# Patient Record
Sex: MALE | Race: WHITE | NOT HISPANIC OR LATINO | URBAN - METROPOLITAN AREA
[De-identification: names, ages, dates, MRNs, and addresses within clinical notes are randomized per-mention and may not be internally consistent; named-entity substitution may affect disease eponyms.]

---

## 2022-02-15 ENCOUNTER — HISTORICAL (OUTPATIENT)
Dept: ADMINISTRATIVE | Facility: HOSPITAL | Age: 57
End: 2022-02-15

## 2022-02-15 LAB
ABS NEUT (OLG): 10.32 (ref 2.1–9.2)
ALBUMIN SERPL-MCNC: 4.1 G/DL (ref 3.5–5)
ALBUMIN/GLOB SERPL: 1.3 {RATIO} (ref 1.1–2)
ALP SERPL-CCNC: 160 U/L (ref 40–150)
ALT SERPL-CCNC: 42 U/L (ref 0–55)
AST SERPL-CCNC: 28 U/L (ref 5–34)
BASOPHILS # BLD AUTO: 0 10*3/UL (ref 0–0.2)
BASOPHILS NFR BLD AUTO: 0 %
BILIRUB SERPL-MCNC: 0.5 MG/DL
BILIRUBIN DIRECT+TOT PNL SERPL-MCNC: 0.2 (ref 0–0.5)
BILIRUBIN DIRECT+TOT PNL SERPL-MCNC: 0.3 (ref 0–0.8)
BUN SERPL-MCNC: 24.3 MG/DL (ref 8.4–25.7)
CALCIUM SERPL-MCNC: 10 MG/DL (ref 8.7–10.5)
CHLORIDE SERPL-SCNC: 104 MMOL/L (ref 98–107)
CHOLEST SERPL-MCNC: 280 MG/DL
CHOLEST/HDLC SERPL: 5 {RATIO} (ref 0–5)
CO2 SERPL-SCNC: 28 MMOL/L (ref 22–29)
CREAT SERPL-MCNC: 0.79 MG/DL (ref 0.73–1.18)
DEPRECATED CALCIDIOL+CALCIFEROL SERPL-MC: 45.2 NG/ML (ref 30–80)
EOSINOPHIL # BLD AUTO: 0 10*3/UL (ref 0–0.9)
EOSINOPHIL NFR BLD AUTO: 0 %
ERYTHROCYTE [DISTWIDTH] IN BLOOD BY AUTOMATED COUNT: 13.5 % (ref 11.5–17)
EST. AVERAGE GLUCOSE BLD GHB EST-MCNC: 108.3 MG/DL
FERRITIN SERPL-MCNC: 118.17 NG/ML (ref 21.81–274.66)
GLOBULIN SER-MCNC: 3.1 G/DL (ref 2.4–3.5)
GLUCOSE SERPL-MCNC: 87 MG/DL (ref 74–100)
HBA1C MFR BLD: 5.4 %
HCT VFR BLD AUTO: 41.4 % (ref 42–52)
HDLC SERPL-MCNC: 56 MG/DL (ref 35–60)
HEMOLYSIS INTERF INDEX SERPL-ACNC: 8
HGB BLD-MCNC: 13.4 G/DL (ref 14–18)
ICTERIC INTERF INDEX SERPL-ACNC: 1
IRON SATN MFR SERPL: 25 % (ref 20–50)
IRON SERPL-MCNC: 87 UG/DL (ref 65–175)
LDLC SERPL CALC-MCNC: 201 MG/DL (ref 50–140)
LIPEMIC INTERF INDEX SERPL-ACNC: 9
LYMPHOCYTES # BLD AUTO: 1.2 10*3/UL (ref 0.6–4.6)
LYMPHOCYTES NFR BLD AUTO: 10 %
MANUAL DIFF? (OHS): NO
MCH RBC QN AUTO: 28.9 PG (ref 27–31)
MCHC RBC AUTO-ENTMCNC: 32.4 G/DL (ref 33–36)
MCV RBC AUTO: 89.2 FL (ref 80–94)
MONOCYTES # BLD AUTO: 0.8 10*3/UL (ref 0.1–1.3)
MONOCYTES NFR BLD AUTO: 6 %
NEUTROPHILS # BLD AUTO: 10.32 10*3/UL (ref 2.1–9.2)
NEUTROPHILS NFR BLD AUTO: 83 %
PLATELET # BLD AUTO: 255 10*3/UL (ref 130–400)
PMV BLD AUTO: 11.6 FL (ref 9.4–12.4)
POTASSIUM SERPL-SCNC: 4.4 MMOL/L (ref 3.5–5.1)
PROT SERPL-MCNC: 7.2 G/DL (ref 6.4–8.3)
PSA SERPL-MCNC: 1.07 NG/ML
RBC # BLD AUTO: 4.64 10*6/UL (ref 4.7–6.1)
SODIUM SERPL-SCNC: 142 MMOL/L (ref 136–145)
TIBC SERPL-MCNC: 262 UG/DL (ref 69–240)
TIBC SERPL-MCNC: 349 UG/DL (ref 250–450)
TRANSFERRIN SERPL-MCNC: 319 MG/DL (ref 174–364)
TRIGL SERPL-MCNC: 116 MG/DL (ref 34–140)
URATE SERPL-MCNC: 6.3 MG/DL (ref 3.5–7.2)
VLDLC SERPL CALC-MCNC: 23 MG/DL
WBC # SPEC AUTO: 12.4 10*3/UL (ref 4.5–11.5)

## 2022-04-10 ENCOUNTER — HISTORICAL (OUTPATIENT)
Dept: ADMINISTRATIVE | Facility: HOSPITAL | Age: 57
End: 2022-04-10
Payer: COMMERCIAL

## 2022-04-26 VITALS
HEIGHT: 67 IN | SYSTOLIC BLOOD PRESSURE: 101 MMHG | BODY MASS INDEX: 30.02 KG/M2 | OXYGEN SATURATION: 100 % | DIASTOLIC BLOOD PRESSURE: 67 MMHG | WEIGHT: 191.25 LBS

## 2022-05-16 ENCOUNTER — TELEPHONE (OUTPATIENT)
Dept: PRIMARY CARE CLINIC | Facility: CLINIC | Age: 57
End: 2022-05-16

## 2022-05-16 RX ORDER — HYDROCHLOROTHIAZIDE 12.5 MG/1
12.5 CAPSULE ORAL
COMMUNITY
Start: 2021-08-27 | End: 2022-05-23 | Stop reason: SDUPTHER

## 2022-05-16 NOTE — TELEPHONE ENCOUNTER
----- Message from Fernanda Spain sent at 5/13/2022 10:58 AM CDT -----  Type: RX Refill Request    Who Called: Santosh    Refill or New Rx:Refill    RX Name and Strength:Colchicine    How is the patient currently taking it? (ex. 1XDay):PRN    Is this a 30 day or 90 day RX:30 day    Preferred Pharmacy with phone number:[facility]  [address]    Local or Mail Order:local    Ordering Provider:Isaiah    Would the patient rather a call back or a response via [facility]? call    Best Call Back Number:[phone]    Additional Information:

## 2022-05-18 ENCOUNTER — TELEPHONE (OUTPATIENT)
Dept: PRIMARY CARE CLINIC | Facility: CLINIC | Age: 57
End: 2022-05-18
Payer: COMMERCIAL

## 2022-05-18 DIAGNOSIS — R53.81 MALAISE AND FATIGUE: ICD-10-CM

## 2022-05-18 DIAGNOSIS — R53.83 MALAISE AND FATIGUE: ICD-10-CM

## 2022-05-18 DIAGNOSIS — Z00.00 WELLNESS EXAMINATION: ICD-10-CM

## 2022-05-18 DIAGNOSIS — Z12.5 PROSTATE CANCER SCREENING: ICD-10-CM

## 2022-05-18 DIAGNOSIS — E34.9 TESTOSTERONE DEFICIENCY: ICD-10-CM

## 2022-05-18 DIAGNOSIS — E78.5 HYPERLIPIDEMIA, UNSPECIFIED HYPERLIPIDEMIA TYPE: Primary | ICD-10-CM

## 2022-05-27 ENCOUNTER — OFFICE VISIT (OUTPATIENT)
Dept: PRIMARY CARE CLINIC | Facility: CLINIC | Age: 57
End: 2022-05-27
Payer: COMMERCIAL

## 2022-05-27 VITALS
HEART RATE: 60 BPM | DIASTOLIC BLOOD PRESSURE: 69 MMHG | WEIGHT: 204.38 LBS | OXYGEN SATURATION: 95 % | BODY MASS INDEX: 32.08 KG/M2 | SYSTOLIC BLOOD PRESSURE: 115 MMHG

## 2022-05-27 DIAGNOSIS — I10 HYPERTENSION, UNSPECIFIED TYPE: ICD-10-CM

## 2022-05-27 DIAGNOSIS — E78.01 FAMILIAL HYPERCHOLESTEROLEMIA: Primary | ICD-10-CM

## 2022-05-27 PROCEDURE — 1160F RVW MEDS BY RX/DR IN RCRD: CPT | Mod: CPTII,,, | Performed by: FAMILY MEDICINE

## 2022-05-27 PROCEDURE — 99214 OFFICE O/P EST MOD 30 MIN: CPT | Mod: ,,, | Performed by: FAMILY MEDICINE

## 2022-05-27 PROCEDURE — 3074F SYST BP LT 130 MM HG: CPT | Mod: CPTII,,, | Performed by: FAMILY MEDICINE

## 2022-05-27 PROCEDURE — 3078F DIAST BP <80 MM HG: CPT | Mod: CPTII,,, | Performed by: FAMILY MEDICINE

## 2022-05-27 PROCEDURE — 1159F MED LIST DOCD IN RCRD: CPT | Mod: CPTII,,, | Performed by: FAMILY MEDICINE

## 2022-05-27 PROCEDURE — 3008F BODY MASS INDEX DOCD: CPT | Mod: CPTII,,, | Performed by: FAMILY MEDICINE

## 2022-05-27 PROCEDURE — 1159F PR MEDICATION LIST DOCUMENTED IN MEDICAL RECORD: ICD-10-PCS | Mod: CPTII,,, | Performed by: FAMILY MEDICINE

## 2022-05-27 PROCEDURE — 3008F PR BODY MASS INDEX (BMI) DOCUMENTED: ICD-10-PCS | Mod: CPTII,,, | Performed by: FAMILY MEDICINE

## 2022-05-27 PROCEDURE — 1160F PR REVIEW ALL MEDS BY PRESCRIBER/CLIN PHARMACIST DOCUMENTED: ICD-10-PCS | Mod: CPTII,,, | Performed by: FAMILY MEDICINE

## 2022-05-27 PROCEDURE — 3074F PR MOST RECENT SYSTOLIC BLOOD PRESSURE < 130 MM HG: ICD-10-PCS | Mod: CPTII,,, | Performed by: FAMILY MEDICINE

## 2022-05-27 PROCEDURE — 99214 PR OFFICE/OUTPT VISIT, EST, LEVL IV, 30-39 MIN: ICD-10-PCS | Mod: ,,, | Performed by: FAMILY MEDICINE

## 2022-05-27 PROCEDURE — 3078F PR MOST RECENT DIASTOLIC BLOOD PRESSURE < 80 MM HG: ICD-10-PCS | Mod: CPTII,,, | Performed by: FAMILY MEDICINE

## 2022-05-27 RX ORDER — PRAVASTATIN SODIUM 10 MG/1
10 TABLET ORAL
COMMUNITY
Start: 2022-02-25 | End: 2022-08-26

## 2022-05-27 RX ORDER — BUPROPION HYDROCHLORIDE 300 MG/1
300 TABLET ORAL
COMMUNITY
Start: 2022-03-17 | End: 2022-08-26

## 2022-05-27 RX ORDER — SERTRALINE HYDROCHLORIDE 25 MG/1
25 TABLET, FILM COATED ORAL DAILY
Qty: 30 TABLET | Refills: 11 | Status: SHIPPED | OUTPATIENT
Start: 2022-05-27 | End: 2022-08-26

## 2022-05-27 RX ORDER — COLCHICINE 0.6 MG/1
TABLET ORAL
COMMUNITY
Start: 2022-05-21

## 2022-05-27 NOTE — ASSESSMENT & PLAN NOTE
Reassess lipid level at this time without FLP and persistent elevation of LDL greater than 160 may increase statin dose or switch to high-intensity statin

## 2022-05-27 NOTE — ASSESSMENT & PLAN NOTE
Blood pressure goal <140/90 (<130/80 if otherwise noted), recommend DASH diet, record BP at home daily and bring log to next office visit to assure that home cuff is calibrated at minimum every 12 months, continue current medication regimen.

## 2022-05-27 NOTE — PROGRESS NOTES
Chief Complaint  Chief Complaint   Patient presents with    Follow-up     With labs       History of Present Illness  Patient presents clinic for routine follow-up visit after medication adjustments.  The patient was last seen in clinic 3 months ago at which time he was re-initiated on hydrochlorothiazide 12.5 mg daily with blood pressure adequately controlled in clinic at this time.  There was also recent initiation of pravastatin 10 mg daily due to intolerance to high-intensity statin atorvastatin previously with LDL increasing to 201 at last check.  He reports non-compliance with low-dose pravastatin at this time and was intended to have FLP performed prior to the visit but this was not performed.  At the last visit he reported unwanted side effects with the Zoloft which was deescalated and started on Wellbutrin which he states has been helping with his depression symptoms but anxiety is still poorly controlled and it was better controlled on Zoloft despite side effects.  The many has intermittent dry cough which occurs mostly at night and does seem to be associated with eating heavy meals or lying flat which has been present for about 4 months    PMH:  Hypertension-HCTZ 12.5 mg daily  Gout-unmedicated, previously allopurinol  Hyperlipidemia-pravastatin 10 mg daily, previously intolerant to atorvastatin  Depression/anxiety- Wellbutrin 150 mg extended release daily, Zoloft concerns for side effects such as unintended weight gain and low libido and dizziness.    Specialist:    Screening:    Review of Systems  Review of Systems   Constitutional: Negative for fatigue and fever.   HENT: Negative for congestion and sore throat.    Eyes: Negative for redness and visual disturbance.   Respiratory: Positive for cough. Negative for shortness of breath.    Cardiovascular: Negative for chest pain and palpitations.   Gastrointestinal: Negative for nausea and vomiting.   Musculoskeletal: Negative for arthralgias and  myalgias.   Neurological: Negative for dizziness and headaches.   Psychiatric/Behavioral: Negative for agitation and confusion.       Physical Exam  Physical Exam  Constitutional:       Appearance: Normal appearance.   HENT:      Head: Normocephalic and atraumatic.   Eyes:      General: No scleral icterus.     Conjunctiva/sclera: Conjunctivae normal.   Cardiovascular:      Rate and Rhythm: Normal rate and regular rhythm.   Pulmonary:      Effort: Pulmonary effort is normal.      Breath sounds: Normal breath sounds.   Abdominal:      Palpations: Abdomen is soft.      Tenderness: There is no abdominal tenderness.   Musculoskeletal:         General: No swelling or deformity.   Skin:     General: Skin is warm and dry.   Neurological:      General: No focal deficit present.      Mental Status: He is alert and oriented to person, place, and time.   Psychiatric:         Mood and Affect: Mood normal.         Behavior: Behavior normal.         Problem List/Past Medical History  Past Medical History:   Diagnosis Date    Depression     HTN (hypertension)        Procedure/Surgical History  Past Surgical History:   Procedure Laterality Date    HERNIA REPAIR         Medications  Current Outpatient Medications on File Prior to Visit   Medication Sig Dispense Refill    buPROPion (WELLBUTRIN XL) 300 MG 24 hr tablet Take 300 mg by mouth.      hydroCHLOROthiazide (MICROZIDE) 12.5 mg capsule TAKE ONE CAPSULE BY MOUTH ONE TIME DAILY 90 capsule 0    pravastatin (PRAVACHOL) 10 MG tablet Take 10 mg by mouth.       No current facility-administered medications on file prior to visit.       Allegies  Review of patient's allergies indicates:  No Known Allergies     Social History  Social History     Socioeconomic History    Marital status: Single   Tobacco Use    Smoking status: Never Smoker    Smokeless tobacco: Never Used   Substance and Sexual Activity    Alcohol use: Yes    Drug use: Never    Sexual activity: Yes       Family  History  History reviewed. No pertinent family history.      Immunizations    There is no immunization history on file for this patient.    Health Maintenance  Health Maintenance   Topic Date Due    Hepatitis C Screening  Never done    TETANUS VACCINE  Never done    PROSTATE-SPECIFIC ANTIGEN  02/15/2023    Lipid Panel  02/15/2027        Assessment / Plan  Problem List Items Addressed This Visit        Cardiac/Vascular    Familial hypercholesterolemia - Primary    Current Assessment & Plan     Reassess lipid level at this time without FLP and persistent elevation of LDL greater than 160 may increase statin dose or switch to high-intensity statin           HTN (hypertension)    Current Assessment & Plan     Blood pressure goal <140/90 (<130/80 if otherwise noted), recommend DASH diet, record BP at home daily and bring log to next office visit to assure that home cuff is calibrated at minimum every 12 months, continue current medication regimen.             GERD-attempt over-the-counter Prilosec 20 mg daily    GA D-reinitiate minimal dose of Zoloft 12.5 mg daily with continued Wellbutrin dose and consider increasing to 25 mg daily if inadequate control of anxiety in no unwanted side effects are noted after 2 weeks    RTC 3 months    Isaiah Colon

## 2022-08-17 DIAGNOSIS — E78.5 HYPERLIPIDEMIA: Primary | ICD-10-CM

## 2022-08-26 ENCOUNTER — OFFICE VISIT (OUTPATIENT)
Dept: PRIMARY CARE CLINIC | Facility: CLINIC | Age: 57
End: 2022-08-26
Payer: COMMERCIAL

## 2022-08-26 VITALS
DIASTOLIC BLOOD PRESSURE: 69 MMHG | OXYGEN SATURATION: 98 % | HEART RATE: 69 BPM | WEIGHT: 201.69 LBS | BODY MASS INDEX: 31.66 KG/M2 | SYSTOLIC BLOOD PRESSURE: 112 MMHG

## 2022-08-26 DIAGNOSIS — F33.41 RECURRENT MAJOR DEPRESSIVE DISORDER, IN PARTIAL REMISSION: ICD-10-CM

## 2022-08-26 DIAGNOSIS — E78.01 FAMILIAL HYPERCHOLESTEROLEMIA: Primary | ICD-10-CM

## 2022-08-26 DIAGNOSIS — I10 HYPERTENSION, UNSPECIFIED TYPE: ICD-10-CM

## 2022-08-26 PROBLEM — F33.9 RECURRENT MAJOR DEPRESSIVE DISORDER: Status: ACTIVE | Noted: 2022-08-26

## 2022-08-26 PROCEDURE — 3078F PR MOST RECENT DIASTOLIC BLOOD PRESSURE < 80 MM HG: ICD-10-PCS | Mod: CPTII,,, | Performed by: FAMILY MEDICINE

## 2022-08-26 PROCEDURE — 3074F PR MOST RECENT SYSTOLIC BLOOD PRESSURE < 130 MM HG: ICD-10-PCS | Mod: CPTII,,, | Performed by: FAMILY MEDICINE

## 2022-08-26 PROCEDURE — 3074F SYST BP LT 130 MM HG: CPT | Mod: CPTII,,, | Performed by: FAMILY MEDICINE

## 2022-08-26 PROCEDURE — G0439 PPPS, SUBSEQ VISIT: HCPCS | Mod: ,,, | Performed by: FAMILY MEDICINE

## 2022-08-26 PROCEDURE — 1160F PR REVIEW ALL MEDS BY PRESCRIBER/CLIN PHARMACIST DOCUMENTED: ICD-10-PCS | Mod: CPTII,,, | Performed by: FAMILY MEDICINE

## 2022-08-26 PROCEDURE — 1159F MED LIST DOCD IN RCRD: CPT | Mod: CPTII,,, | Performed by: FAMILY MEDICINE

## 2022-08-26 PROCEDURE — G0439 PR MEDICARE ANNUAL WELLNESS SUBSEQUENT VISIT: ICD-10-PCS | Mod: ,,, | Performed by: FAMILY MEDICINE

## 2022-08-26 PROCEDURE — 1160F RVW MEDS BY RX/DR IN RCRD: CPT | Mod: CPTII,,, | Performed by: FAMILY MEDICINE

## 2022-08-26 PROCEDURE — 3008F PR BODY MASS INDEX (BMI) DOCUMENTED: ICD-10-PCS | Mod: CPTII,,, | Performed by: FAMILY MEDICINE

## 2022-08-26 PROCEDURE — 3008F BODY MASS INDEX DOCD: CPT | Mod: CPTII,,, | Performed by: FAMILY MEDICINE

## 2022-08-26 PROCEDURE — 1159F PR MEDICATION LIST DOCUMENTED IN MEDICAL RECORD: ICD-10-PCS | Mod: CPTII,,, | Performed by: FAMILY MEDICINE

## 2022-08-26 PROCEDURE — 3078F DIAST BP <80 MM HG: CPT | Mod: CPTII,,, | Performed by: FAMILY MEDICINE

## 2022-08-26 RX ORDER — PRAVASTATIN SODIUM 20 MG/1
20 TABLET ORAL NIGHTLY
Qty: 90 TABLET | Refills: 3 | Status: SHIPPED | OUTPATIENT
Start: 2022-08-26

## 2022-08-26 RX ORDER — ESCITALOPRAM OXALATE 5 MG/1
5 TABLET ORAL DAILY
Qty: 30 TABLET | Refills: 11 | Status: SHIPPED | OUTPATIENT
Start: 2022-08-26 | End: 2022-10-07

## 2022-08-26 NOTE — PROGRESS NOTES
Chief Complaint  Chief Complaint   Patient presents with    Annual Exam       History of Present Illness  Patient presents clinic for routine follow-up wellness visit after medication adjustments.  To unwanted side effects of sexual side effects Zoloft was attempted to be stopped but anxiety symptoms increased so it was re-initiated at 12.5 mg daily endo was helping with his anxiety and depression he states that it was still causing such was side effects so he discontinued it but continues taking his Wellbutrin 300 milligrams daily at this time but finds that with the medicine though his depression seems stable he still notices anxiety with agitation and is wondering if anything else can be done to help give him more balance of his symptoms.  Blood pressure remains well controlled on current regimen of antihypertensive patient want side effects to high-dose statins and noncompliance to pravastatin which was initiated earlier in the year as well will work performed prior to this visit with LDL decreased from over 200 now down to 163.    PMH:  Hypertension-HCTZ 12.5 mg daily  Gout-unmedicated, previously allopurinol  Hyperlipidemia-pravastatin 10 mg daily, previously intolerant to atorvastatin  Depression/anxiety- Wellbutrin 300 mg extended release daily, Zoloft concerns for side effects such as unintended weight gain and low libido and dizziness.    Review of Systems  Review of Systems   Constitutional: Negative for fatigue and fever.   HENT: Negative for congestion and sore throat.    Eyes: Negative for redness and visual disturbance.   Respiratory: Negative for cough and shortness of breath.    Cardiovascular: Negative for chest pain and palpitations.   Gastrointestinal: Negative for nausea and vomiting.   Musculoskeletal: Negative for arthralgias and myalgias.   Neurological: Negative for dizziness and headaches.   Psychiatric/Behavioral: Negative for agitation and confusion. The patient is nervous/anxious.         Physical Exam  Physical Exam  Constitutional:       Appearance: Normal appearance.   HENT:      Head: Normocephalic and atraumatic.   Eyes:      General: No scleral icterus.     Conjunctiva/sclera: Conjunctivae normal.   Cardiovascular:      Rate and Rhythm: Normal rate and regular rhythm.   Pulmonary:      Effort: Pulmonary effort is normal.      Breath sounds: Normal breath sounds.   Abdominal:      Palpations: Abdomen is soft.      Tenderness: There is no abdominal tenderness.   Musculoskeletal:         General: No swelling or deformity.   Skin:     General: Skin is warm and dry.   Neurological:      General: No focal deficit present.      Mental Status: He is alert and oriented to person, place, and time.   Psychiatric:         Mood and Affect: Mood normal.         Behavior: Behavior normal.         Problem List/Past Medical History  Past Medical History:   Diagnosis Date    Depression     HTN (hypertension)        Procedure/Surgical History  Past Surgical History:   Procedure Laterality Date    HERNIA REPAIR         Medications  Current Outpatient Medications on File Prior to Visit   Medication Sig Dispense Refill    colchicine (COLCRYS) 0.6 mg tablet Take by mouth.      hydroCHLOROthiazide (MICROZIDE) 12.5 mg capsule TAKE ONE CAPSULE BY MOUTH ONE TIME DAILY 90 capsule 0    [DISCONTINUED] buPROPion (WELLBUTRIN XL) 300 MG 24 hr tablet Take 300 mg by mouth.      [DISCONTINUED] pravastatin (PRAVACHOL) 10 MG tablet Take 10 mg by mouth.      [DISCONTINUED] sertraline (ZOLOFT) 25 MG tablet Take 1 tablet (25 mg total) by mouth once daily. 30 tablet 11     No current facility-administered medications on file prior to visit.       Allegies  Review of patient's allergies indicates:  No Known Allergies     Social History  Social History     Socioeconomic History    Marital status: Single   Tobacco Use    Smoking status: Never Smoker    Smokeless tobacco: Never Used   Substance and Sexual Activity     Alcohol use: Yes    Drug use: Never    Sexual activity: Yes       Family History  History reviewed. No pertinent family history.      Immunizations    There is no immunization history on file for this patient.    Health Maintenance  Health Maintenance   Topic Date Due    Hepatitis C Screening  Never done    TETANUS VACCINE  Never done    PROSTATE-SPECIFIC ANTIGEN  02/15/2023    Lipid Panel  02/15/2027        Assessment / Plan  Problem List Items Addressed This Visit        Psychiatric    Recurrent major depressive disorder    Overview     Start 5mg of lexapro daily and monitor for improvement continue Wellbutrin 300 milligrams daily but may deescalate if adequate improvement is noted with Lexapro.              Cardiac/Vascular    Familial hypercholesterolemia - Primary    Current Assessment & Plan     Increase pravastatin from 10 up to 20 milligrams daily and reassess FLP in future.           HTN (hypertension)          RTC 1.5 months    Isaiah Colon

## 2022-08-26 NOTE — ASSESSMENT & PLAN NOTE
Increase pravastatin from 10 up to 20 milligrams daily and reassess FLP in future.   Self Administrated

## 2022-08-29 ENCOUNTER — OFFICE VISIT (OUTPATIENT)
Dept: PRIMARY CARE CLINIC | Facility: CLINIC | Age: 57
End: 2022-08-29
Payer: COMMERCIAL

## 2022-08-29 VITALS
HEART RATE: 64 BPM | BODY MASS INDEX: 32.96 KG/M2 | DIASTOLIC BLOOD PRESSURE: 85 MMHG | WEIGHT: 210 LBS | OXYGEN SATURATION: 98 % | SYSTOLIC BLOOD PRESSURE: 147 MMHG

## 2022-08-29 DIAGNOSIS — M10.072 ACUTE IDIOPATHIC GOUT OF LEFT FOOT: ICD-10-CM

## 2022-08-29 PROCEDURE — 1159F MED LIST DOCD IN RCRD: CPT | Mod: CPTII,,, | Performed by: FAMILY MEDICINE

## 2022-08-29 PROCEDURE — 3008F PR BODY MASS INDEX (BMI) DOCUMENTED: ICD-10-PCS | Mod: CPTII,,, | Performed by: FAMILY MEDICINE

## 2022-08-29 PROCEDURE — 3077F PR MOST RECENT SYSTOLIC BLOOD PRESSURE >= 140 MM HG: ICD-10-PCS | Mod: CPTII,,, | Performed by: FAMILY MEDICINE

## 2022-08-29 PROCEDURE — 3079F PR MOST RECENT DIASTOLIC BLOOD PRESSURE 80-89 MM HG: ICD-10-PCS | Mod: CPTII,,, | Performed by: FAMILY MEDICINE

## 2022-08-29 PROCEDURE — 99214 OFFICE O/P EST MOD 30 MIN: CPT | Mod: 25,,, | Performed by: FAMILY MEDICINE

## 2022-08-29 PROCEDURE — 99214 PR OFFICE/OUTPT VISIT, EST, LEVL IV, 30-39 MIN: ICD-10-PCS | Mod: 25,,, | Performed by: FAMILY MEDICINE

## 2022-08-29 PROCEDURE — 3077F SYST BP >= 140 MM HG: CPT | Mod: CPTII,,, | Performed by: FAMILY MEDICINE

## 2022-08-29 PROCEDURE — 96372 PR INJECTION,THERAP/PROPH/DIAG2ST, IM OR SUBCUT: ICD-10-PCS | Mod: ,,, | Performed by: FAMILY MEDICINE

## 2022-08-29 PROCEDURE — 96372 THER/PROPH/DIAG INJ SC/IM: CPT | Mod: ,,, | Performed by: FAMILY MEDICINE

## 2022-08-29 PROCEDURE — 3079F DIAST BP 80-89 MM HG: CPT | Mod: CPTII,,, | Performed by: FAMILY MEDICINE

## 2022-08-29 PROCEDURE — 1159F PR MEDICATION LIST DOCUMENTED IN MEDICAL RECORD: ICD-10-PCS | Mod: CPTII,,, | Performed by: FAMILY MEDICINE

## 2022-08-29 PROCEDURE — 3008F BODY MASS INDEX DOCD: CPT | Mod: CPTII,,, | Performed by: FAMILY MEDICINE

## 2022-08-29 RX ORDER — INDOMETHACIN 50 MG/1
50 CAPSULE ORAL
Qty: 30 CAPSULE | Refills: 0 | Status: SHIPPED | OUTPATIENT
Start: 2022-08-29

## 2022-08-29 RX ORDER — METHYLPREDNISOLONE 4 MG/1
TABLET ORAL
Qty: 21 EACH | Refills: 0 | Status: SHIPPED | OUTPATIENT
Start: 2022-08-29 | End: 2022-09-19

## 2022-08-29 RX ORDER — DEXAMETHASONE SODIUM PHOSPHATE 4 MG/ML
8 INJECTION, SOLUTION INTRA-ARTICULAR; INTRALESIONAL; INTRAMUSCULAR; INTRAVENOUS; SOFT TISSUE
Status: COMPLETED | OUTPATIENT
Start: 2022-08-29 | End: 2022-08-29

## 2022-08-29 RX ADMIN — DEXAMETHASONE SODIUM PHOSPHATE 8 MG: 4 INJECTION, SOLUTION INTRA-ARTICULAR; INTRALESIONAL; INTRAMUSCULAR; INTRAVENOUS; SOFT TISSUE at 10:08

## 2022-08-29 NOTE — PROGRESS NOTES
Chief Complaint  Chief Complaint   Patient presents with    Gout     Left middle toe       History of Present Illness  Patient presents clinic less than 1 week after his last visit reporting that over the past 2 days he started developing pain with redness and swelling in his left foot similar to prior gout flares.  He has not had the opportunity to initiate medication adjustments made at the last visit reports that his last gout flare was close to a year ago    PMH:  Hypertension-HCTZ 12.5 mg daily  Gout-unmedicated, previously allopurinol  Hyperlipidemia-pravastatin 20 mg daily, previously intolerant to atorvastatin  Depression/anxiety- Lexapro 5 mg daily, Wellbutrin 300 mg extended release daily, Zoloft concerns for side effects such as unintended weight gain and low libido and dizziness.    Review of Systems  Review of Systems   Constitutional:  Negative for fatigue and fever.   HENT:  Negative for congestion and sore throat.    Eyes:  Negative for redness and visual disturbance.   Respiratory:  Negative for cough and shortness of breath.    Cardiovascular:  Negative for chest pain and palpitations.   Gastrointestinal:  Negative for nausea and vomiting.   Musculoskeletal:  Negative for arthralgias and myalgias.        Left foot pain and swelling   Neurological:  Negative for dizziness and headaches.   Psychiatric/Behavioral:  Negative for agitation and confusion. The patient is not nervous/anxious.      Physical Exam  Physical Exam  Constitutional:       Appearance: Normal appearance.   HENT:      Head: Normocephalic and atraumatic.   Eyes:      General: No scleral icterus.     Conjunctiva/sclera: Conjunctivae normal.   Cardiovascular:      Rate and Rhythm: Normal rate and regular rhythm.   Pulmonary:      Effort: Pulmonary effort is normal.      Breath sounds: Normal breath sounds.   Abdominal:      Palpations: Abdomen is soft.      Tenderness: There is no abdominal tenderness.   Musculoskeletal:          General: No swelling or deformity.      Comments: Left foot diffusely swollen but not to the point of the ankle with redness noted seeming to be centralized at the metatarsophalangeal joint of the 1st great toe with tenderness noted to light palpation and no other obvious deformity.   Skin:     General: Skin is warm and dry.   Neurological:      General: No focal deficit present.      Mental Status: He is alert and oriented to person, place, and time.   Psychiatric:         Mood and Affect: Mood normal.         Behavior: Behavior normal.       Problem List/Past Medical History  Past Medical History:   Diagnosis Date    Depression     HTN (hypertension)        Procedure/Surgical History  Past Surgical History:   Procedure Laterality Date    HERNIA REPAIR         Medications  Current Outpatient Medications on File Prior to Visit   Medication Sig Dispense Refill    colchicine (COLCRYS) 0.6 mg tablet Take by mouth.      EScitalopram oxalate (LEXAPRO) 5 MG Tab Take 1 tablet (5 mg total) by mouth once daily. 30 tablet 11    hydroCHLOROthiazide (MICROZIDE) 12.5 mg capsule TAKE ONE CAPSULE BY MOUTH ONE TIME DAILY 90 capsule 0    pravastatin (PRAVACHOL) 20 MG tablet Take 1 tablet (20 mg total) by mouth every evening. 90 tablet 3     No current facility-administered medications on file prior to visit.       Allegies  Review of patient's allergies indicates:  No Known Allergies     Social History  Social History     Socioeconomic History    Marital status: Single   Tobacco Use    Smoking status: Never    Smokeless tobacco: Never   Substance and Sexual Activity    Alcohol use: Yes    Drug use: Never    Sexual activity: Yes       Family History  No family history on file.      Immunizations    There is no immunization history on file for this patient.    Health Maintenance  Health Maintenance   Topic Date Due    Hepatitis C Screening  Never done    TETANUS VACCINE  Never done    PROSTATE-SPECIFIC ANTIGEN  02/15/2023    Lipid  Panel  02/15/2027        Assessment / Plan  Problem List Items Addressed This Visit          Orthopedic    Acute idiopathic gout of left foot    Overview     dexamethasone shot today with medrol dose pack to be started tomorrow and indomethacin only as needed for pain with low purine diet and adequate hydration          RTC 1.5 months for scheduled f/u    Isaiah Colon

## 2022-10-07 ENCOUNTER — OFFICE VISIT (OUTPATIENT)
Dept: PRIMARY CARE CLINIC | Facility: CLINIC | Age: 57
End: 2022-10-07
Payer: COMMERCIAL

## 2022-10-07 DIAGNOSIS — F41.1 GAD (GENERALIZED ANXIETY DISORDER): Primary | ICD-10-CM

## 2022-10-07 DIAGNOSIS — F32.A DEPRESSION, UNSPECIFIED DEPRESSION TYPE: ICD-10-CM

## 2022-10-07 DIAGNOSIS — E78.01 FAMILIAL HYPERCHOLESTEROLEMIA: ICD-10-CM

## 2022-10-07 PROCEDURE — 99214 OFFICE O/P EST MOD 30 MIN: CPT | Mod: 95,,, | Performed by: FAMILY MEDICINE

## 2022-10-07 PROCEDURE — 1160F RVW MEDS BY RX/DR IN RCRD: CPT | Mod: CPTII,95,, | Performed by: FAMILY MEDICINE

## 2022-10-07 PROCEDURE — 1159F MED LIST DOCD IN RCRD: CPT | Mod: CPTII,95,, | Performed by: FAMILY MEDICINE

## 2022-10-07 PROCEDURE — 99214 PR OFFICE/OUTPT VISIT, EST, LEVL IV, 30-39 MIN: ICD-10-PCS | Mod: 95,,, | Performed by: FAMILY MEDICINE

## 2022-10-07 PROCEDURE — 1159F PR MEDICATION LIST DOCUMENTED IN MEDICAL RECORD: ICD-10-PCS | Mod: CPTII,95,, | Performed by: FAMILY MEDICINE

## 2022-10-07 PROCEDURE — 1160F PR REVIEW ALL MEDS BY PRESCRIBER/CLIN PHARMACIST DOCUMENTED: ICD-10-PCS | Mod: CPTII,95,, | Performed by: FAMILY MEDICINE

## 2022-10-07 RX ORDER — BUPROPION HYDROCHLORIDE 300 MG/1
300 TABLET ORAL DAILY
Qty: 90 TABLET | Refills: 3 | Status: SHIPPED | OUTPATIENT
Start: 2022-10-07 | End: 2023-10-07

## 2022-10-07 RX ORDER — VENLAFAXINE HYDROCHLORIDE 37.5 MG/1
37.5 CAPSULE, EXTENDED RELEASE ORAL DAILY
Qty: 30 CAPSULE | Refills: 11 | Status: SHIPPED | OUTPATIENT
Start: 2022-10-07 | End: 2023-10-07

## 2022-10-07 NOTE — ASSESSMENT & PLAN NOTE
Continue Wellbutrin for depression management with discontinuation of Lexapro and initiation of SNRI with venlafaxine 37.5 mg daily.

## 2022-10-07 NOTE — PROGRESS NOTES
Chief Complaint  Chief Complaint   Patient presents with    Follow-up       History of Present Illness  This note is documentation of a telemedicine encounter.  The patient was treated using real-time audio and video, according to [facility] protocols. I, distant provider, conducted the visit from a remote office location, also in accordance with [facility] protocols. The patient participated in the visit at a non-[facility] location, which was selected by the patient (or patient´s representative), the patient's home. I am licensed in [place], which is the state where the patient stated they were located during this appointment. The patient (or patient´s representative) stated that they understood and accepted the privacy and security risks to their information at their location.    Patient presents for follow-up after medication adjustment.  At the last visit he was continued on Wellbutrin with adequate control of depression however continues to struggle with anxiety.  Lexapro 5 mg daily was attempted at the last visit but similar to his reaction to Zoloft previously he found caused unwanted side effects with low libido, and lightheadedness and stops the medicine.  He has tolerated his higher dose of pravastatin 20 mg without unwanted side effects.      PMH:  Hypertension-HCTZ 12.5 mg daily  Gout-unmedicated, previously allopurinol  Hyperlipidemia-pravastatin 20 mg daily, previously intolerant to atorvastatin  Depression/anxiety- Wellbutrin 300 mg extended release daily, Zoloft concerns for side effects such as unintended weight gain and low libido and dizziness.    Review of Systems  Review of Systems   Constitutional:  Negative for activity change, fatigue and fever.   HENT:  Negative for congestion, hearing loss, rhinorrhea, sore throat and trouble swallowing.    Eyes:  Negative for discharge, redness and visual disturbance.   Respiratory:  Negative for cough, chest tightness, shortness of breath and wheezing.     Cardiovascular:  Negative for chest pain and palpitations.   Gastrointestinal:  Negative for blood in stool, constipation, diarrhea, nausea and vomiting.   Endocrine: Negative for polydipsia and polyuria.   Genitourinary:  Negative for difficulty urinating, hematuria and urgency.   Musculoskeletal:  Negative for arthralgias, joint swelling, myalgias and neck pain.   Neurological:  Negative for dizziness, weakness and headaches.   Psychiatric/Behavioral:  Negative for agitation, confusion and dysphoric mood. The patient is nervous/anxious.      Physical Exam  Limited due to telemedicine    Problem List/Past Medical History  Past Medical History:   Diagnosis Date    Depression     HTN (hypertension)        Procedure/Surgical History  Past Surgical History:   Procedure Laterality Date    HERNIA REPAIR         Medications  Current Outpatient Medications on File Prior to Visit   Medication Sig Dispense Refill    colchicine (COLCRYS) 0.6 mg tablet Take by mouth.      hydroCHLOROthiazide (MICROZIDE) 12.5 mg capsule TAKE ONE CAPSULE BY MOUTH ONE TIME DAILY 90 capsule 0    indomethacin (INDOCIN) 50 MG capsule Take 1 capsule (50 mg total) by mouth 3 (three) times daily with meals. 30 capsule 0    pravastatin (PRAVACHOL) 20 MG tablet Take 1 tablet (20 mg total) by mouth every evening. 90 tablet 3    [DISCONTINUED] EScitalopram oxalate (LEXAPRO) 5 MG Tab Take 1 tablet (5 mg total) by mouth once daily. 30 tablet 11     No current facility-administered medications on file prior to visit.       Allegies  Review of patient's allergies indicates:  No Known Allergies     Social History  Social History     Socioeconomic History    Marital status: Single   Tobacco Use    Smoking status: Never    Smokeless tobacco: Never   Substance and Sexual Activity    Alcohol use: Yes    Drug use: Never    Sexual activity: Yes       Family History  History reviewed. No pertinent family history.      Immunizations    There is no immunization  history on file for this patient.    Health Maintenance  Health Maintenance   Topic Date Due    Hepatitis C Screening  Never done    TETANUS VACCINE  Never done    PROSTATE-SPECIFIC ANTIGEN  02/15/2023    Lipid Panel  02/15/2027        Assessment / Plan  Problem List Items Addressed This Visit          Psychiatric    WON (generalized anxiety disorder) - Primary    Current Assessment & Plan     Continue Wellbutrin for depression management with discontinuation of Lexapro and initiation of SNRI with venlafaxine 37.5 mg daily.            Cardiac/Vascular    Familial hypercholesterolemia    Current Assessment & Plan     Recommend continuation of dyslipidemia diet with statin medication and repeat FLP at routine interval.          Other Visit Diagnoses       Depression, unspecified depression type              RTC 1 months    Isaiah Colon

## 2023-01-23 ENCOUNTER — PATIENT MESSAGE (OUTPATIENT)
Dept: ADMINISTRATIVE | Facility: HOSPITAL | Age: 58
End: 2023-01-23
Payer: COMMERCIAL

## 2023-05-01 ENCOUNTER — PATIENT MESSAGE (OUTPATIENT)
Dept: ADMINISTRATIVE | Facility: HOSPITAL | Age: 58
End: 2023-05-01
Payer: COMMERCIAL

## 2023-06-11 DIAGNOSIS — I10 HYPERTENSION, UNSPECIFIED TYPE: ICD-10-CM

## 2023-06-12 RX ORDER — HYDROCHLOROTHIAZIDE 12.5 MG/1
CAPSULE ORAL
Qty: 90 CAPSULE | Refills: 0 | Status: SHIPPED | OUTPATIENT
Start: 2023-06-12

## 2023-07-25 ENCOUNTER — PATIENT MESSAGE (OUTPATIENT)
Dept: ADMINISTRATIVE | Facility: HOSPITAL | Age: 58
End: 2023-07-25
Payer: COMMERCIAL